# Patient Record
Sex: FEMALE | Race: WHITE | NOT HISPANIC OR LATINO | Employment: OTHER | URBAN - METROPOLITAN AREA
[De-identification: names, ages, dates, MRNs, and addresses within clinical notes are randomized per-mention and may not be internally consistent; named-entity substitution may affect disease eponyms.]

---

## 2017-05-16 PROBLEM — Q79.9 BONY ABNORMALITY: Status: ACTIVE | Noted: 2017-05-16

## 2017-05-16 PROBLEM — J90 PLEURAL EFFUSION: Status: ACTIVE | Noted: 2017-05-16

## 2017-05-16 PROBLEM — Z92.89: Status: ACTIVE | Noted: 2017-05-16

## 2017-06-21 ENCOUNTER — HOSPITAL ENCOUNTER (OUTPATIENT)
Dept: MAMMOGRAPHY | Facility: HOSPITAL | Age: 72
Discharge: HOME | End: 2017-06-21
Attending: INTERNAL MEDICINE | Admitting: INTERNAL MEDICINE

## 2017-06-21 DIAGNOSIS — C50.312 MALIGNANT NEOPLASM OF LOWER-INNER QUADRANT OF LEFT FEMALE BREAST (HCC): ICD-10-CM

## 2017-06-21 DIAGNOSIS — Z12.31 ENCOUNTER FOR SCREENING MAMMOGRAM FOR MALIGNANT NEOPLASM OF BREAST: ICD-10-CM

## 2017-06-21 PROCEDURE — G0202 SCR MAMMO BI INCL CAD: HCPCS

## 2017-06-21 PROCEDURE — 77063 BREAST TOMOSYNTHESIS BI: CPT

## 2017-06-21 PROCEDURE — 77063 BREAST TOMOSYNTHESIS BI: CPT | Performed by: RADIOLOGY

## 2017-06-21 PROCEDURE — G0202 SCR MAMMO BI INCL CAD: HCPCS | Performed by: RADIOLOGY

## 2017-07-12 ENCOUNTER — OFFICE VISIT (OUTPATIENT)
Dept: ONCOLOGY | Facility: CLINIC | Age: 72
End: 2017-07-12

## 2017-07-12 ENCOUNTER — LAB (OUTPATIENT)
Dept: LAB | Facility: HOSPITAL | Age: 72
End: 2017-07-12

## 2017-07-12 VITALS
RESPIRATION RATE: 16 BRPM | HEART RATE: 76 BPM | SYSTOLIC BLOOD PRESSURE: 144 MMHG | WEIGHT: 122 LBS | TEMPERATURE: 97.2 F | BODY MASS INDEX: 23.03 KG/M2 | HEIGHT: 61 IN | DIASTOLIC BLOOD PRESSURE: 78 MMHG

## 2017-07-12 DIAGNOSIS — C50.312 MALIGNANT NEOPLASM OF LOWER-INNER QUADRANT OF LEFT FEMALE BREAST (HCC): ICD-10-CM

## 2017-07-12 DIAGNOSIS — C50.312 MALIGNANT NEOPLASM OF LOWER-INNER QUADRANT OF LEFT FEMALE BREAST (HCC): Primary | ICD-10-CM

## 2017-07-12 DIAGNOSIS — Z12.31 ENCOUNTER FOR SCREENING MAMMOGRAM FOR HIGH-RISK PATIENT: Primary | ICD-10-CM

## 2017-07-12 LAB
ALBUMIN SERPL-MCNC: 4.2 G/DL (ref 3.2–4.8)
ALBUMIN/GLOB SERPL: 1.1 G/DL (ref 1.5–2.5)
ALP SERPL-CCNC: 75 U/L (ref 25–100)
ALT SERPL W P-5'-P-CCNC: 19 U/L (ref 7–40)
ANION GAP SERPL CALCULATED.3IONS-SCNC: 4 MMOL/L (ref 3–11)
AST SERPL-CCNC: 25 U/L (ref 0–33)
BILIRUB SERPL-MCNC: 0.4 MG/DL (ref 0.3–1.2)
BUN BLD-MCNC: 18 MG/DL (ref 9–23)
BUN/CREAT SERPL: 22.5 (ref 7–25)
CALCIUM SPEC-SCNC: 9.8 MG/DL (ref 8.7–10.4)
CHLORIDE SERPL-SCNC: 105 MMOL/L (ref 99–109)
CO2 SERPL-SCNC: 26 MMOL/L (ref 20–31)
CREAT BLD-MCNC: 0.8 MG/DL (ref 0.6–1.3)
ERYTHROCYTE [DISTWIDTH] IN BLOOD BY AUTOMATED COUNT: 13.7 % (ref 11.3–14.5)
GFR SERPL CREATININE-BSD FRML MDRD: 71 ML/MIN/1.73
GLOBULIN UR ELPH-MCNC: 3.9 GM/DL
GLUCOSE BLD-MCNC: 85 MG/DL (ref 70–100)
HCT VFR BLD AUTO: 35.5 % (ref 34.5–44)
HGB BLD-MCNC: 11.3 G/DL (ref 11.5–15.5)
LYMPHOCYTES # BLD AUTO: 3.4 10*3/MM3 (ref 0.6–4.8)
LYMPHOCYTES NFR BLD AUTO: 30.5 % (ref 24–44)
MCH RBC QN AUTO: 27.4 PG (ref 27–31)
MCHC RBC AUTO-ENTMCNC: 31.9 G/DL (ref 32–36)
MCV RBC AUTO: 85.7 FL (ref 80–99)
MONOCYTES # BLD AUTO: 0.8 10*3/MM3 (ref 0–1)
MONOCYTES NFR BLD AUTO: 7.5 % (ref 0–12)
NEUTROPHILS # BLD AUTO: 7 10*3/MM3 (ref 1.5–8.3)
NEUTROPHILS NFR BLD AUTO: 62 % (ref 41–71)
PLATELET # BLD AUTO: 276 10*3/MM3 (ref 150–450)
PMV BLD AUTO: 9.5 FL (ref 6–12)
POTASSIUM BLD-SCNC: 4.2 MMOL/L (ref 3.5–5.5)
PROT SERPL-MCNC: 8.1 G/DL (ref 5.7–8.2)
RBC # BLD AUTO: 4.14 10*6/MM3 (ref 3.89–5.14)
SODIUM BLD-SCNC: 135 MMOL/L (ref 132–146)
WBC NRBC COR # BLD: 11.3 10*3/MM3 (ref 3.5–10.8)

## 2017-07-12 PROCEDURE — 85025 COMPLETE CBC W/AUTO DIFF WBC: CPT

## 2017-07-12 PROCEDURE — 99213 OFFICE O/P EST LOW 20 MIN: CPT | Performed by: INTERNAL MEDICINE

## 2017-07-12 PROCEDURE — 36415 COLL VENOUS BLD VENIPUNCTURE: CPT

## 2017-07-12 PROCEDURE — 80053 COMPREHEN METABOLIC PANEL: CPT | Performed by: INTERNAL MEDICINE

## 2017-07-12 NOTE — PROGRESS NOTES
"Chief Complaint       PROBLEM LIST   Patient Active Problem List   Diagnosis   • Malignant neoplasm of lower-inner quadrant of left female breast   • Pleural effusion   • Bony abnormality   • History of bone scan       HISTORY OF PRESENT ILLNESS:   ***    Past Medical History, Past Surgical History, Social History, Family History have been reviewed and are without significant changes except as mentioned.      Medications:      Current Outpatient Prescriptions:   •  ALPRAZOLAM PO, Take 0.5 mg by mouth as needed., Disp: , Rfl:   •  Calcium Carbonate-Vitamin D (CALCIUM + D PO), Take 1 tablet by mouth daily., Disp: , Rfl:   •  Levothyroxine Sodium (SYNTHROID PO), Take 75 mcg by mouth daily., Disp: , Rfl:   •  OMEPRAZOLE PO, Take 20 mg by mouth daily., Disp: , Rfl:     ALLERGIES:    Allergies   Allergen Reactions   • Sulfa Antibiotics      SULFA (SULFONAMIDE ANTIBIOTICS)       ROS:  Review of Systems  ***      Objective:    /78 Comment: RUE  Pulse 76  Temp 97.2 °F (36.2 °C) (Temporal Artery )   Resp 16  Ht 61\" (154.9 cm)  Wt 122 lb (55.3 kg)  BMI 23.05 kg/m2    Physical Exam ***          RECENT LABS:  Hematology WBC   Date Value Ref Range Status   07/12/2017 11.30 (H) 3.50 - 10.80 10*3/mm3 Final     Hemoglobin   Date Value Ref Range Status   07/12/2017 11.3 (L) 11.5 - 15.5 g/dL Final     Hematocrit   Date Value Ref Range Status   07/12/2017 35.5 34.5 - 44.0 % Final     MCV   Date Value Ref Range Status   07/12/2017 85.7 80.0 - 99.0 fL Final     RDW   Date Value Ref Range Status   07/12/2017 13.7 11.3 - 14.5 % Final     MPV   Date Value Ref Range Status   07/12/2017 9.5 6.0 - 12.0 fL Final     Platelets   Date Value Ref Range Status   07/12/2017 276 150 - 450 10*3/mm3 Final     Neutrophils, Absolute   Date Value Ref Range Status   07/12/2017 7.00 1.50 - 8.30 10*3/mm3 Final     Lymphocytes, Absolute   Date Value Ref Range Status   07/12/2017 3.40 0.60 - 4.80 10*3/mm3 Final     Monocytes, Absolute   Date Value " Ref Range Status   07/12/2017 0.80 0.00 - 1.00 10*3/mm3 Final     Eosinophils Absolute   Date Value Ref Range Status   06/24/2014 0.25 0.10 - 0.30 K/mcL Final     Basophils Absolute   Date Value Ref Range Status   06/24/2014 0.05 0.00 - 0.20 K/mcL Final       Glucose   Date Value Ref Range Status   07/12/2017 85 70 - 100 mg/dL Final     Sodium   Date Value Ref Range Status   07/12/2017 135 132 - 146 mmol/L Final     Potassium   Date Value Ref Range Status   07/12/2017 4.2 3.5 - 5.5 mmol/L Final     CO2   Date Value Ref Range Status   07/12/2017 26.0 20.0 - 31.0 mmol/L Final     Chloride   Date Value Ref Range Status   07/12/2017 105 99 - 109 mmol/L Final     Anion Gap   Date Value Ref Range Status   07/12/2017 4.0 3.0 - 11.0 mmol/L Final     Creatinine   Date Value Ref Range Status   07/12/2017 0.80 0.60 - 1.30 mg/dL Final     BUN   Date Value Ref Range Status   07/12/2017 18 9 - 23 mg/dL Final     BUN/Creatinine Ratio   Date Value Ref Range Status   07/12/2017 22.5 7.0 - 25.0 Final     Calcium   Date Value Ref Range Status   07/12/2017 9.8 8.7 - 10.4 mg/dL Final     eGFR Non  Amer   Date Value Ref Range Status   07/12/2017 71 >60 mL/min/1.73 Final     Alkaline Phosphatase   Date Value Ref Range Status   07/12/2017 75 25 - 100 U/L Final     Total Protein   Date Value Ref Range Status   07/12/2017 8.1 5.7 - 8.2 g/dL Final     ALT (SGPT)   Date Value Ref Range Status   07/12/2017 19 7 - 40 U/L Final     AST (SGOT)   Date Value Ref Range Status   07/12/2017 25 0 - 33 U/L Final     Total Bilirubin   Date Value Ref Range Status   07/12/2017 0.4 0.3 - 1.2 mg/dL Final     Albumin   Date Value Ref Range Status   07/12/2017 4.20 3.20 - 4.80 g/dL Final     Globulin   Date Value Ref Range Status   07/12/2017 3.9 gm/dL Final     A/G Ratio   Date Value Ref Range Status   07/12/2017 1.1 (L) 1.5 - 2.5 g/dL Final          Perf Status: ***    Assessment/Plan    Diagnoses and all orders for this visit:    Malignant neoplasm of  lower-inner quadrant of left female breast      ***      Taqueria Webster MD , 7/12/2017    CC:

## 2017-07-12 NOTE — PROGRESS NOTES
Chief Complaint   Follow-up stage I premenopausal hormone negative left breast cancer diagnosed May 1996, with history of subsequent local failure-status post left mastectomy.    PROBLEM LIST   Patient Active Problem List   Diagnosis   • Malignant neoplasm of lower-inner quadrant of left female breast   • Pleural effusion   • Bony abnormality   • History of bone scan       HISTORY OF PRESENT ILLNESS:   Yearly follow-up.  Feels well.  Health has been good.  No new neurologic bony pulmonary GI or  symptoms.  Has been outdoors a good bit of the summer because she loves to garden.    Past Medical History, Past Surgical History, Social History, Family History have been reviewed and are without significant changes except as mentioned.      Medications:      Current Outpatient Prescriptions:   •  ALPRAZOLAM PO, Take 0.5 mg by mouth as needed., Disp: , Rfl:   •  Calcium Carbonate-Vitamin D (CALCIUM + D PO), Take 1 tablet by mouth daily., Disp: , Rfl:   •  Levothyroxine Sodium (SYNTHROID PO), Take 75 mcg by mouth daily., Disp: , Rfl:   •  OMEPRAZOLE PO, Take 20 mg by mouth daily., Disp: , Rfl:     ALLERGIES:    Allergies   Allergen Reactions   • Sulfa Antibiotics      SULFA (SULFONAMIDE ANTIBIOTICS)       ROS:  Review of Systems   Constitutional: Negative for activity change and appetite change.   HENT: Negative for mouth sores, sinus pressure and voice change.    Eyes: Negative for visual disturbance.   Respiratory: Negative for shortness of breath.    Cardiovascular: Negative for chest pain.   Gastrointestinal: Negative for abdominal pain and vomiting.   Genitourinary: Negative for dysuria.   Musculoskeletal: Negative for arthralgias and myalgias.        Osteoarthritic symptoms at baseline   Skin: Negative for color change.   Neurological: Negative for dizziness, syncope and headaches.   Hematological: Negative for adenopathy.   Psychiatric/Behavioral: Negative for confusion, sleep disturbance and suicidal ideas. The  "patient is not nervous/anxious.            Objective:    /78 Comment: RUE  Pulse 76  Temp 97.2 °F (36.2 °C) (Temporal Artery )   Resp 16  Ht 61\" (154.9 cm)  Wt 122 lb (55.3 kg)  BMI 23.05 kg/m2    Physical Exam   Constitutional: She is oriented to person, place, and time. She appears well-developed and well-nourished. No distress.   Appears youthful and vigorous and healthy   HENT:   Head: Normocephalic.   Mouth/Throat: Oropharynx is clear and moist.   Eyes: Conjunctivae and EOM are normal. No scleral icterus.   Neck: Normal range of motion. Neck supple. No thyromegaly present.   Cardiovascular: Normal rate, regular rhythm and normal heart sounds.    No murmur heard.  Pulmonary/Chest: Effort normal and breath sounds normal. She has no wheezes. She has no rales.   Left breast implant in place.  Cosmesis except above.  No worrisome mass palpable there or in her native right breast.   Abdominal: Soft. Bowel sounds are normal. She exhibits no distension and no mass. There is no tenderness.   Musculoskeletal: She exhibits no edema or tenderness.   Lymphadenopathy:     She has no cervical adenopathy.   Neurological: She is alert and oriented to person, place, and time. She displays normal reflexes. No cranial nerve deficit.   Skin: Skin is warm and dry. No rash noted.   Psychiatric: She has a normal mood and affect. Judgment normal.   Vitals reviewed.             RECENT LABS:  Hematology WBC   Date Value Ref Range Status   07/12/2017 11.30 (H) 3.50 - 10.80 10*3/mm3 Final     Hemoglobin   Date Value Ref Range Status   07/12/2017 11.3 (L) 11.5 - 15.5 g/dL Final     Hematocrit   Date Value Ref Range Status   07/12/2017 35.5 34.5 - 44.0 % Final     MCV   Date Value Ref Range Status   07/12/2017 85.7 80.0 - 99.0 fL Final     RDW   Date Value Ref Range Status   07/12/2017 13.7 11.3 - 14.5 % Final     MPV   Date Value Ref Range Status   07/12/2017 9.5 6.0 - 12.0 fL Final     Platelets   Date Value Ref Range Status "   07/12/2017 276 150 - 450 10*3/mm3 Final     Neutrophils, Absolute   Date Value Ref Range Status   07/12/2017 7.00 1.50 - 8.30 10*3/mm3 Final     Lymphocytes, Absolute   Date Value Ref Range Status   07/12/2017 3.40 0.60 - 4.80 10*3/mm3 Final     Monocytes, Absolute   Date Value Ref Range Status   07/12/2017 0.80 0.00 - 1.00 10*3/mm3 Final     Eosinophils Absolute   Date Value Ref Range Status   06/24/2014 0.25 0.10 - 0.30 K/mcL Final     Basophils Absolute   Date Value Ref Range Status   06/24/2014 0.05 0.00 - 0.20 K/mcL Final       Glucose   Date Value Ref Range Status   07/12/2017 85 70 - 100 mg/dL Final     Sodium   Date Value Ref Range Status   07/12/2017 135 132 - 146 mmol/L Final     Potassium   Date Value Ref Range Status   07/12/2017 4.2 3.5 - 5.5 mmol/L Final     CO2   Date Value Ref Range Status   07/12/2017 26.0 20.0 - 31.0 mmol/L Final     Chloride   Date Value Ref Range Status   07/12/2017 105 99 - 109 mmol/L Final     Anion Gap   Date Value Ref Range Status   07/12/2017 4.0 3.0 - 11.0 mmol/L Final     Creatinine   Date Value Ref Range Status   07/12/2017 0.80 0.60 - 1.30 mg/dL Final     BUN   Date Value Ref Range Status   07/12/2017 18 9 - 23 mg/dL Final     BUN/Creatinine Ratio   Date Value Ref Range Status   07/12/2017 22.5 7.0 - 25.0 Final     Calcium   Date Value Ref Range Status   07/12/2017 9.8 8.7 - 10.4 mg/dL Final     eGFR Non  Amer   Date Value Ref Range Status   07/12/2017 71 >60 mL/min/1.73 Final     Alkaline Phosphatase   Date Value Ref Range Status   07/12/2017 75 25 - 100 U/L Final     Total Protein   Date Value Ref Range Status   07/12/2017 8.1 5.7 - 8.2 g/dL Final     ALT (SGPT)   Date Value Ref Range Status   07/12/2017 19 7 - 40 U/L Final     AST (SGOT)   Date Value Ref Range Status   07/12/2017 25 0 - 33 U/L Final     Total Bilirubin   Date Value Ref Range Status   07/12/2017 0.4 0.3 - 1.2 mg/dL Final     Albumin   Date Value Ref Range Status   07/12/2017 4.20 3.20 - 4.80  g/dL Final     Globulin   Date Value Ref Range Status   07/12/2017 3.9 gm/dL Final     A/G Ratio   Date Value Ref Range Status   07/12/2017 1.1 (L) 1.5 - 2.5 g/dL Final          Perf Status:0    Assessment/Plan    Diagnoses and all orders for this visit:    Malignant neoplasm of lower-inner quadrant of left female breast      Patient is doing well.  Right mammography in June was benign.  She has no evidence of recurrence.  I'll plan on seeing her back in a year as usual.      Taqueria Webster MD , 7/12/2017    CC:

## 2017-07-17 ENCOUNTER — RESULTS ENCOUNTER (OUTPATIENT)
Dept: ONCOLOGY | Facility: CLINIC | Age: 72
End: 2017-07-17

## 2017-07-17 DIAGNOSIS — C50.312 MALIGNANT NEOPLASM OF LOWER-INNER QUADRANT OF LEFT FEMALE BREAST (HCC): ICD-10-CM

## 2017-10-02 ENCOUNTER — TELEPHONE (OUTPATIENT)
Dept: ONCOLOGY | Facility: CLINIC | Age: 72
End: 2017-10-02

## 2017-10-02 NOTE — TELEPHONE ENCOUNTER
Patient advised that her proteins didn't look terribly abnormal, but I will make sure that dr Webster has looked them over when he is back in the office on Wednesday.  Will call her back after I discuss it with Dr Webster

## 2017-10-02 NOTE — TELEPHONE ENCOUNTER
----- Message from Madiha Flynn sent at 10/2/2017 12:26 PM EDT -----  Regarding: [facility]  Contact: [phone]  Patient called and wants to make sure Dr. Webster reviewed her labs from Dr. Kaylin Khalil and if he needs to see her?

## 2017-10-04 ENCOUNTER — TELEPHONE (OUTPATIENT)
Dept: ONCOLOGY | Facility: CLINIC | Age: 72
End: 2017-10-04

## 2018-06-22 ENCOUNTER — APPOINTMENT (OUTPATIENT)
Dept: MAMMOGRAPHY | Facility: HOSPITAL | Age: 73
End: 2018-06-22
Attending: INTERNAL MEDICINE

## 2018-06-27 ENCOUNTER — HOSPITAL ENCOUNTER (OUTPATIENT)
Dept: MAMMOGRAPHY | Facility: HOSPITAL | Age: 73
Discharge: HOME | End: 2018-06-27
Attending: INTERNAL MEDICINE | Admitting: INTERNAL MEDICINE

## 2018-06-27 DIAGNOSIS — Z12.31 ENCOUNTER FOR SCREENING MAMMOGRAM FOR HIGH-RISK PATIENT: ICD-10-CM

## 2018-06-27 DIAGNOSIS — C50.312 MALIGNANT NEOPLASM OF LOWER-INNER QUADRANT OF LEFT FEMALE BREAST (HCC): ICD-10-CM

## 2018-06-27 PROCEDURE — 77067 SCR MAMMO BI INCL CAD: CPT

## 2018-06-27 PROCEDURE — 77067 SCR MAMMO BI INCL CAD: CPT | Performed by: RADIOLOGY

## 2018-06-27 PROCEDURE — 77063 BREAST TOMOSYNTHESIS BI: CPT

## 2018-06-27 PROCEDURE — 77063 BREAST TOMOSYNTHESIS BI: CPT | Performed by: RADIOLOGY

## 2018-07-10 DIAGNOSIS — C50.312 MALIGNANT NEOPLASM OF LOWER-INNER QUADRANT OF LEFT FEMALE BREAST, UNSPECIFIED ESTROGEN RECEPTOR STATUS (HCC): Primary | ICD-10-CM

## 2018-07-11 ENCOUNTER — LAB (OUTPATIENT)
Dept: LAB | Facility: HOSPITAL | Age: 73
End: 2018-07-11

## 2018-07-11 ENCOUNTER — OFFICE VISIT (OUTPATIENT)
Dept: ONCOLOGY | Facility: CLINIC | Age: 73
End: 2018-07-11

## 2018-07-11 VITALS
RESPIRATION RATE: 16 BRPM | TEMPERATURE: 97.1 F | WEIGHT: 125 LBS | BODY MASS INDEX: 23.6 KG/M2 | HEIGHT: 61 IN | HEART RATE: 71 BPM | SYSTOLIC BLOOD PRESSURE: 141 MMHG | DIASTOLIC BLOOD PRESSURE: 82 MMHG

## 2018-07-11 DIAGNOSIS — C50.312 MALIGNANT NEOPLASM OF LOWER-INNER QUADRANT OF LEFT FEMALE BREAST, UNSPECIFIED ESTROGEN RECEPTOR STATUS (HCC): ICD-10-CM

## 2018-07-11 DIAGNOSIS — Z12.31 ENCOUNTER FOR SCREENING MAMMOGRAM FOR HIGH-RISK PATIENT: Primary | ICD-10-CM

## 2018-07-11 DIAGNOSIS — C50.312 MALIGNANT NEOPLASM OF LOWER-INNER QUADRANT OF LEFT FEMALE BREAST, UNSPECIFIED ESTROGEN RECEPTOR STATUS (HCC): Primary | ICD-10-CM

## 2018-07-11 LAB
ALBUMIN SERPL-MCNC: 4.07 G/DL (ref 3.2–4.8)
ALBUMIN/GLOB SERPL: 1.2 G/DL (ref 1.5–2.5)
ALP SERPL-CCNC: 65 U/L (ref 25–100)
ALT SERPL W P-5'-P-CCNC: 14 U/L (ref 7–40)
ANION GAP SERPL CALCULATED.3IONS-SCNC: 6 MMOL/L (ref 3–11)
AST SERPL-CCNC: 23 U/L (ref 0–33)
BILIRUB SERPL-MCNC: 0.3 MG/DL (ref 0.3–1.2)
BUN BLD-MCNC: 19 MG/DL (ref 9–23)
BUN/CREAT SERPL: 21.8 (ref 7–25)
CALCIUM SPEC-SCNC: 9.2 MG/DL (ref 8.7–10.4)
CHLORIDE SERPL-SCNC: 109 MMOL/L (ref 99–109)
CO2 SERPL-SCNC: 25 MMOL/L (ref 20–31)
CREAT BLD-MCNC: 0.87 MG/DL (ref 0.6–1.3)
ERYTHROCYTE [DISTWIDTH] IN BLOOD BY AUTOMATED COUNT: 13.9 % (ref 11.3–14.5)
GFR SERPL CREATININE-BSD FRML MDRD: 64 ML/MIN/1.73
GLOBULIN UR ELPH-MCNC: 3.5 GM/DL
GLUCOSE BLD-MCNC: 73 MG/DL (ref 70–100)
HCT VFR BLD AUTO: 36.9 % (ref 34.5–44)
HGB BLD-MCNC: 11.7 G/DL (ref 11.5–15.5)
LYMPHOCYTES # BLD AUTO: 2.5 10*3/MM3 (ref 0.6–4.8)
LYMPHOCYTES NFR BLD AUTO: 36.4 % (ref 24–44)
MCH RBC QN AUTO: 27.5 PG (ref 27–31)
MCHC RBC AUTO-ENTMCNC: 31.7 G/DL (ref 32–36)
MCV RBC AUTO: 86.7 FL (ref 80–99)
MONOCYTES # BLD AUTO: 0.8 10*3/MM3 (ref 0–1)
MONOCYTES NFR BLD AUTO: 11.5 % (ref 0–12)
NEUTROPHILS # BLD AUTO: 3.6 10*3/MM3 (ref 1.5–8.3)
NEUTROPHILS NFR BLD AUTO: 52.1 % (ref 41–71)
PLATELET # BLD AUTO: 198 10*3/MM3 (ref 150–450)
PMV BLD AUTO: 10.2 FL (ref 6–12)
POTASSIUM BLD-SCNC: 3.8 MMOL/L (ref 3.5–5.5)
PROT SERPL-MCNC: 7.6 G/DL (ref 5.7–8.2)
RBC # BLD AUTO: 4.26 10*6/MM3 (ref 3.89–5.14)
SODIUM BLD-SCNC: 140 MMOL/L (ref 132–146)
WBC NRBC COR # BLD: 6.9 10*3/MM3 (ref 3.5–10.8)

## 2018-07-11 PROCEDURE — 80053 COMPREHEN METABOLIC PANEL: CPT

## 2018-07-11 PROCEDURE — 85025 COMPLETE CBC W/AUTO DIFF WBC: CPT

## 2018-07-11 PROCEDURE — 36415 COLL VENOUS BLD VENIPUNCTURE: CPT

## 2018-07-11 PROCEDURE — 99213 OFFICE O/P EST LOW 20 MIN: CPT | Performed by: INTERNAL MEDICINE

## 2018-07-11 NOTE — PROGRESS NOTES
Chief Complaint   Follow-up stage I premenopausal hormone negative left breast cancer diagnosed in May 1996, with history of subsequent local failure years ago necessitating left mastectomy after original lumpectomy and radiotherapy-followed without recurrence sinceapproximately approximately 2011.      PROBLEM LIST   Patient Active Problem List   Diagnosis   • Malignant neoplasm of lower-inner quadrant of left female breast (CMS/HCC)   • Pleural effusion   • Bony abnormality   • History of bone scan       HISTORY OF PRESENT ILLNESS:   Feels well.  She and her  are both retired and they spend a good bit of time caring for their grandchildren and great-grandchildren.  Her health is been good.  She has not noted any new lumps bumps or any enlarged lymph nodes.  She has no new neurologic or bony symptoms.    Past Medical History, Past Surgical History, Social History, Family History have been reviewed and are without significant changes except as mentioned.      Medications:      Current Outpatient Prescriptions:   •  Denosumab (PROLIA SC), Inject  under the skin Every 6 (Six) Months., Disp: , Rfl:   •  ALPRAZOLAM PO, Take 0.5 mg by mouth as needed., Disp: , Rfl:   •  Calcium Carbonate-Vitamin D (CALCIUM + D PO), Take 1 tablet by mouth daily., Disp: , Rfl:   •  Levothyroxine Sodium (SYNTHROID PO), Take 75 mcg by mouth daily., Disp: , Rfl:   •  OMEPRAZOLE PO, Take 20 mg by mouth daily., Disp: , Rfl:     ALLERGIES:    Allergies   Allergen Reactions   • Sulfa Antibiotics      SULFA (SULFONAMIDE ANTIBIOTICS)       ROS:  Review of Systems   Constitutional: Negative for activity change and appetite change.        Overall vigor is good   HENT: Negative for mouth sores, sinus pressure and voice change.    Eyes: Negative for visual disturbance.   Respiratory: Negative for shortness of breath.    Cardiovascular: Negative for chest pain.   Gastrointestinal: Negative for abdominal pain and vomiting.   Genitourinary:  "Negative for dysuria.   Musculoskeletal: Negative for arthralgias and myalgias.        Chronic osteoarthritic symptoms at baseline   Skin: Negative for color change.   Neurological: Negative for dizziness, syncope and headaches.   Hematological: Negative for adenopathy.   Psychiatric/Behavioral: Negative for confusion, sleep disturbance and suicidal ideas. The patient is not nervous/anxious.            Objective:    /82 Comment: RUE  Pulse 71   Temp 97.1 °F (36.2 °C) (Temporal Artery )   Resp 16   Ht 154.9 cm (61\")   Wt 56.7 kg (125 lb)   BMI 23.62 kg/m²     Physical Exam   Constitutional: She is oriented to person, place, and time. She appears well-developed and well-nourished. No distress.   Patient appears vigorous and healthy and younger than stated age   HENT:   Head: Normocephalic.   Mouth/Throat: Oropharynx is clear and moist.   Eyes: Conjunctivae and EOM are normal. No scleral icterus.   Neck: Normal range of motion. Neck supple. No thyromegaly present.   Cardiovascular: Normal rate, regular rhythm and normal heart sounds.    No murmur heard.  Pulmonary/Chest: Effort normal and breath sounds normal. She has no wheezes. She has no rales.   Left breast prosthesis in place with no worrisome mass palpable.  Right breast remarkable for evidence of remote biopsies-no worrisome mass palpable there either   Abdominal: Soft. Bowel sounds are normal. She exhibits no distension and no mass. There is no tenderness.   Musculoskeletal: She exhibits no edema or tenderness.   Lymphadenopathy:     She has no cervical adenopathy.   Neurological: She is alert and oriented to person, place, and time. She displays normal reflexes. No cranial nerve deficit.   Skin: Skin is warm and dry. No rash noted.   Psychiatric: She has a normal mood and affect. Judgment normal.   Vitals reviewed.             RECENT LABS:  Hematology WBC   Date Value Ref Range Status   07/11/2018 6.90 3.50 - 10.80 10*3/mm3 Final     Hemoglobin "   Date Value Ref Range Status   07/11/2018 11.7 11.5 - 15.5 g/dL Final     Hematocrit   Date Value Ref Range Status   07/11/2018 36.9 34.5 - 44.0 % Final     MCV   Date Value Ref Range Status   07/11/2018 86.7 80.0 - 99.0 fL Final     RDW   Date Value Ref Range Status   07/11/2018 13.9 11.3 - 14.5 % Final     MPV   Date Value Ref Range Status   07/11/2018 10.2 6.0 - 12.0 fL Final     Platelets   Date Value Ref Range Status   07/11/2018 198 150 - 450 10*3/mm3 Final     Neutrophils, Absolute   Date Value Ref Range Status   07/11/2018 3.60 1.50 - 8.30 10*3/mm3 Final     Lymphocytes, Absolute   Date Value Ref Range Status   07/11/2018 2.50 0.60 - 4.80 10*3/mm3 Final     Monocytes, Absolute   Date Value Ref Range Status   07/11/2018 0.80 0.00 - 1.00 10*3/mm3 Final     Eosinophils Absolute   Date Value Ref Range Status   06/24/2014 0.25 0.10 - 0.30 K/mcL Final     Basophils Absolute   Date Value Ref Range Status   06/24/2014 0.05 0.00 - 0.20 K/mcL Final       Glucose   Date Value Ref Range Status   07/11/2018 73 70 - 100 mg/dL Final     Sodium   Date Value Ref Range Status   07/11/2018 140 132 - 146 mmol/L Final     Potassium   Date Value Ref Range Status   07/11/2018 3.8 3.5 - 5.5 mmol/L Final     CO2   Date Value Ref Range Status   07/11/2018 25.0 20.0 - 31.0 mmol/L Final     Chloride   Date Value Ref Range Status   07/11/2018 109 99 - 109 mmol/L Final     Anion Gap   Date Value Ref Range Status   07/11/2018 6.0 3.0 - 11.0 mmol/L Final     Creatinine   Date Value Ref Range Status   07/11/2018 0.87 0.60 - 1.30 mg/dL Final     BUN   Date Value Ref Range Status   07/11/2018 19 9 - 23 mg/dL Final     BUN/Creatinine Ratio   Date Value Ref Range Status   07/11/2018 21.8 7.0 - 25.0 Final     Calcium   Date Value Ref Range Status   07/11/2018 9.2 8.7 - 10.4 mg/dL Final     eGFR Non  Amer   Date Value Ref Range Status   07/11/2018 64 >60 mL/min/1.73 Final     Alkaline Phosphatase   Date Value Ref Range Status   07/11/2018  65 25 - 100 U/L Final     Total Protein   Date Value Ref Range Status   07/11/2018 7.6 5.7 - 8.2 g/dL Final     ALT (SGPT)   Date Value Ref Range Status   07/11/2018 14 7 - 40 U/L Final     AST (SGOT)   Date Value Ref Range Status   07/11/2018 23 0 - 33 U/L Final     Total Bilirubin   Date Value Ref Range Status   07/11/2018 0.3 0.3 - 1.2 mg/dL Final     Albumin   Date Value Ref Range Status   07/11/2018 4.07 3.20 - 4.80 g/dL Final     Globulin   Date Value Ref Range Status   07/11/2018 3.5 gm/dL Final     A/G Ratio   Date Value Ref Range Status   07/11/2018 1.2 (L) 1.5 - 2.5 g/dL Final          Perf Status:0    Assessment/Plan    Diagnoses and all orders for this visit:    Malignant neoplasm of lower-inner quadrant of left female breast, unspecified estrogen receptor status (CMS/HCC)      Patient is doing splendidly.  She has no evidence of recurrent disease.  She is more than 6 years out and I think action more than 7 years out from her local failure.  She.follow up with her primary care physician which I think is quite fine.  She is up-to-date regarding mammography and I told her to continue getting those every June or July.      Taqueria Webster MD , 7/11/2018    CC:

## 2019-06-28 ENCOUNTER — HOSPITAL ENCOUNTER (OUTPATIENT)
Dept: MAMMOGRAPHY | Facility: HOSPITAL | Age: 74
Discharge: HOME | End: 2019-06-28
Attending: INTERNAL MEDICINE | Admitting: INTERNAL MEDICINE

## 2019-06-28 DIAGNOSIS — Z12.31 ENCOUNTER FOR SCREENING MAMMOGRAM FOR HIGH-RISK PATIENT: ICD-10-CM

## 2019-06-28 DIAGNOSIS — C50.312 MALIGNANT NEOPLASM OF LOWER-INNER QUADRANT OF LEFT FEMALE BREAST, UNSPECIFIED ESTROGEN RECEPTOR STATUS (HCC): ICD-10-CM

## 2019-06-28 PROCEDURE — 77067 SCR MAMMO BI INCL CAD: CPT

## 2019-06-28 PROCEDURE — 77067 SCR MAMMO BI INCL CAD: CPT | Performed by: RADIOLOGY

## 2019-06-28 PROCEDURE — 77063 BREAST TOMOSYNTHESIS BI: CPT | Performed by: RADIOLOGY

## 2019-06-28 PROCEDURE — 77063 BREAST TOMOSYNTHESIS BI: CPT

## 2019-07-16 DIAGNOSIS — C50.312 MALIGNANT NEOPLASM OF LOWER-INNER QUADRANT OF LEFT FEMALE BREAST, UNSPECIFIED ESTROGEN RECEPTOR STATUS (HCC): Primary | ICD-10-CM

## 2019-07-18 NOTE — PROGRESS NOTES
"      PROBLEM LIST:  1. History of stage I ER negative left breast cancer, diagnosed 1996.    A) lumpectomy showed a 3 cm primary tumor, 21 LN uninvolved.  B) local recurrence treated with mastectomy 2001    Subjective     CHIEF COMPLAINT: breast cancer    HISTORY OF PRESENT ILLNESS:   Brenda Montes De Oca returns for follow-up.    She is transitioning care from Dr. Webster.  She says she is feeling well and has no complaints today.  She has been seen yearly with mammograms for the past several years.    Past Medical History, Past Surgical History, Social History, Family History have been reviewed and are without significant changes except as mentioned.    Review of Systems   A comprehensive 14 point review of systems was performed and was negative except as mentioned.    Medications:  The current medication list was reviewed in the EMR    ALLERGIES:    Allergies   Allergen Reactions   • Sulfa Antibiotics      SULFA (SULFONAMIDE ANTIBIOTICS)       Objective      /75 Comment: RUE  Pulse 72   Temp 97.1 °F (36.2 °C) (Temporal)   Resp 16   Ht 154.9 cm (61\")   Wt 54.4 kg (120 lb)   SpO2 99% Comment: RA  BMI 22.67 kg/m²      Performance Status: 0    General: well appearing female in no acute distress  Neuro: alert and oriented  HEENT: sclera anicteric, oropharynx clear  Lymphatics: no cervical, supraclavicular, or axillary adenopathy  Cardiovascular: regular rate and rhythm, no murmurs  Lungs: clear to auscultation bilaterally  Abdomen: soft, nontender, nondistended.  No palpable organomegaly  Extremeties: no lower extremity edema  Skin: no rashes, lesions, bruising, or petechiae  Psych: mood and affect appropriate      RECENT LABS:  Results for BRENDA MONTES DE OCA (MRN [id number]) as of 7/19/2019 11:23   Ref. Range 7/19/2019 10:00   WBC Latest Ref Range: 3.40 - 10.80 10*3/mm3 6.20   RBC Latest Ref Range: 3.77 - 5.28 10*6/mm3 4.20   Hemoglobin Latest Ref Range: 12.0 - 15.9 g/dL 12.1   Hematocrit Latest Ref Range: " 34.0 - 46.6 % 36.6   RDW Latest Ref Range: 12.3 - 15.4 % 13.4   MCV Latest Ref Range: 79.0 - 97.0 fL 87.1   MCH Latest Ref Range: 26.6 - 33.0 pg 28.8   MCHC Latest Ref Range: 31.5 - 35.7 g/dL 33.1   MPV Latest Ref Range: 6.0 - 12.0 fL 9.5   Platelets Latest Ref Range: 140 - 450 10*3/mm3 272         Right mammogram 6/28/19:     IMPRESSION:  No findings suspicious for malignancy.      BI-RADS CATEGORY: 2, BENIGN     RECOMMENDATION: Yearly mammogram, yearly clinical breast exam, and  encourage self breast awareness.      Assessment/Plan   Brenda Murphy is a 73 y.o. year old female with a history of breast cancer, ER negative, in 1996, and recurrent left breast cancer in 2001 treated with mastectomy.    At this point she does not have any significant recurrence risk.  She should continue her mammograms of the right breast on a yearly basis.  I think she can follow-up with her primary care doctor going forward.  I am certainly happy to see her if the need arises in the future.    She asked about her need to continue routine Pap smears.  Her recommendations would not be any different from the general population.    Follow-up PRN                I spent 15 minutes with the patient. I spent > 50% percent of this time counseling and discussing prognosis, diagnostic testing, evaluation, current status and management.        Ashely Butts MD  [facility] Hematology and Oncology    7/19/2019          CC:

## 2019-07-19 ENCOUNTER — OFFICE VISIT (OUTPATIENT)
Dept: ONCOLOGY | Facility: CLINIC | Age: 74
End: 2019-07-19

## 2019-07-19 ENCOUNTER — LAB (OUTPATIENT)
Dept: LAB | Facility: HOSPITAL | Age: 74
End: 2019-07-19

## 2019-07-19 VITALS
HEIGHT: 61 IN | OXYGEN SATURATION: 99 % | DIASTOLIC BLOOD PRESSURE: 75 MMHG | HEART RATE: 72 BPM | BODY MASS INDEX: 22.66 KG/M2 | SYSTOLIC BLOOD PRESSURE: 155 MMHG | RESPIRATION RATE: 16 BRPM | TEMPERATURE: 97.1 F | WEIGHT: 120 LBS

## 2019-07-19 DIAGNOSIS — C50.312 MALIGNANT NEOPLASM OF LOWER-INNER QUADRANT OF LEFT FEMALE BREAST, UNSPECIFIED ESTROGEN RECEPTOR STATUS (HCC): Primary | ICD-10-CM

## 2019-07-19 DIAGNOSIS — C50.312 MALIGNANT NEOPLASM OF LOWER-INNER QUADRANT OF LEFT FEMALE BREAST, UNSPECIFIED ESTROGEN RECEPTOR STATUS (HCC): ICD-10-CM

## 2019-07-19 LAB
ALBUMIN SERPL-MCNC: 3.9 G/DL (ref 3.5–5.2)
ALBUMIN/GLOB SERPL: 0.9 G/DL
ALP SERPL-CCNC: 64 U/L (ref 39–117)
ALT SERPL W P-5'-P-CCNC: 18 U/L (ref 1–33)
ANION GAP SERPL CALCULATED.3IONS-SCNC: 12 MMOL/L (ref 5–15)
AST SERPL-CCNC: 29 U/L (ref 1–32)
BILIRUB SERPL-MCNC: 0.3 MG/DL (ref 0.2–1.2)
BUN BLD-MCNC: 11 MG/DL (ref 8–23)
BUN/CREAT SERPL: 13.1 (ref 7–25)
CALCIUM SPEC-SCNC: 9.4 MG/DL (ref 8.6–10.5)
CHLORIDE SERPL-SCNC: 97 MMOL/L (ref 98–107)
CO2 SERPL-SCNC: 25 MMOL/L (ref 22–29)
CREAT BLD-MCNC: 0.84 MG/DL (ref 0.57–1)
ERYTHROCYTE [DISTWIDTH] IN BLOOD BY AUTOMATED COUNT: 13.4 % (ref 12.3–15.4)
GFR SERPL CREATININE-BSD FRML MDRD: 67 ML/MIN/1.73
GLOBULIN UR ELPH-MCNC: 4.2 GM/DL
GLUCOSE BLD-MCNC: 95 MG/DL (ref 65–99)
HCT VFR BLD AUTO: 36.6 % (ref 34–46.6)
HGB BLD-MCNC: 12.1 G/DL (ref 12–15.9)
LYMPHOCYTES # BLD AUTO: 2.9 10*3/MM3 (ref 0.7–3.1)
LYMPHOCYTES NFR BLD AUTO: 47.1 % (ref 19.6–45.3)
MCH RBC QN AUTO: 28.8 PG (ref 26.6–33)
MCHC RBC AUTO-ENTMCNC: 33.1 G/DL (ref 31.5–35.7)
MCV RBC AUTO: 87.1 FL (ref 79–97)
MONOCYTES # BLD AUTO: 0.7 10*3/MM3 (ref 0.1–0.9)
MONOCYTES NFR BLD AUTO: 10.7 % (ref 5–12)
NEUTROPHILS # BLD AUTO: 2.6 10*3/MM3 (ref 1.7–7)
NEUTROPHILS NFR BLD AUTO: 42.2 % (ref 42.7–76)
PLATELET # BLD AUTO: 272 10*3/MM3 (ref 140–450)
PMV BLD AUTO: 9.5 FL (ref 6–12)
POTASSIUM BLD-SCNC: 4 MMOL/L (ref 3.5–5.2)
PROT SERPL-MCNC: 8.1 G/DL (ref 6–8.5)
RBC # BLD AUTO: 4.2 10*6/MM3 (ref 3.77–5.28)
SODIUM BLD-SCNC: 134 MMOL/L (ref 136–145)
WBC NRBC COR # BLD: 6.2 10*3/MM3 (ref 3.4–10.8)

## 2019-07-19 PROCEDURE — 80053 COMPREHEN METABOLIC PANEL: CPT

## 2019-07-19 PROCEDURE — 36415 COLL VENOUS BLD VENIPUNCTURE: CPT

## 2019-07-19 PROCEDURE — 99213 OFFICE O/P EST LOW 20 MIN: CPT | Performed by: INTERNAL MEDICINE

## 2019-07-19 PROCEDURE — 85025 COMPLETE CBC W/AUTO DIFF WBC: CPT

## 2020-03-05 ENCOUNTER — TRANSCRIBE ORDERS (OUTPATIENT)
Dept: ADMINISTRATIVE | Facility: HOSPITAL | Age: 75
End: 2020-03-05

## 2020-03-05 DIAGNOSIS — Z12.31 VISIT FOR SCREENING MAMMOGRAM: Primary | ICD-10-CM

## 2020-07-02 ENCOUNTER — HOSPITAL ENCOUNTER (OUTPATIENT)
Dept: MAMMOGRAPHY | Facility: HOSPITAL | Age: 75
Discharge: HOME | End: 2020-07-02
Admitting: FAMILY MEDICINE

## 2020-07-02 DIAGNOSIS — Z12.31 VISIT FOR SCREENING MAMMOGRAM: ICD-10-CM

## 2020-07-02 PROCEDURE — 77067 SCR MAMMO BI INCL CAD: CPT

## 2020-07-02 PROCEDURE — 77063 BREAST TOMOSYNTHESIS BI: CPT

## 2020-07-02 PROCEDURE — 77067 SCR MAMMO BI INCL CAD: CPT | Performed by: RADIOLOGY

## 2020-07-02 PROCEDURE — 77063 BREAST TOMOSYNTHESIS BI: CPT | Performed by: RADIOLOGY

## 2021-03-03 ENCOUNTER — TRANSCRIBE ORDERS (OUTPATIENT)
Dept: LAB | Facility: HOSPITAL | Age: 76
End: 2021-03-03

## 2021-03-03 ENCOUNTER — LAB (OUTPATIENT)
Dept: LAB | Facility: HOSPITAL | Age: 76
End: 2021-03-03

## 2021-03-03 DIAGNOSIS — L53.9 ERYTHEMATOUS CONDITION: ICD-10-CM

## 2021-03-03 DIAGNOSIS — R03.0 ELEVATED BLOOD PRESSURE READING WITHOUT DIAGNOSIS OF HYPERTENSION: Primary | ICD-10-CM

## 2021-03-03 DIAGNOSIS — R22.1 NECK MASS: ICD-10-CM

## 2021-03-03 DIAGNOSIS — R03.0 ELEVATED BLOOD PRESSURE READING WITHOUT DIAGNOSIS OF HYPERTENSION: ICD-10-CM

## 2021-03-03 LAB
ALBUMIN SERPL-MCNC: 4.2 G/DL (ref 3.5–5.2)
ALBUMIN/GLOB SERPL: 1.1 G/DL
ALP SERPL-CCNC: 81 U/L (ref 39–117)
ALT SERPL W P-5'-P-CCNC: 13 U/L (ref 1–33)
ANION GAP SERPL CALCULATED.3IONS-SCNC: 10.6 MMOL/L (ref 5–15)
AST SERPL-CCNC: 24 U/L (ref 1–32)
BASOPHILS # BLD AUTO: 0.06 10*3/MM3 (ref 0–0.2)
BASOPHILS NFR BLD AUTO: 0.7 % (ref 0–1.5)
BILIRUB SERPL-MCNC: 0.4 MG/DL (ref 0–1.2)
BUN SERPL-MCNC: 13 MG/DL (ref 8–23)
BUN/CREAT SERPL: 13.3 (ref 7–25)
CALCIUM SPEC-SCNC: 9.6 MG/DL (ref 8.6–10.5)
CHLORIDE SERPL-SCNC: 98 MMOL/L (ref 98–107)
CO2 SERPL-SCNC: 23.4 MMOL/L (ref 22–29)
CREAT SERPL-MCNC: 0.98 MG/DL (ref 0.57–1)
DEPRECATED RDW RBC AUTO: 41.9 FL (ref 37–54)
EOSINOPHIL # BLD AUTO: 0.25 10*3/MM3 (ref 0–0.4)
EOSINOPHIL NFR BLD AUTO: 2.9 % (ref 0.3–6.2)
ERYTHROCYTE [DISTWIDTH] IN BLOOD BY AUTOMATED COUNT: 13.4 % (ref 12.3–15.4)
GFR SERPL CREATININE-BSD FRML MDRD: 55 ML/MIN/1.73
GLOBULIN UR ELPH-MCNC: 3.8 GM/DL
GLUCOSE SERPL-MCNC: 91 MG/DL (ref 65–99)
HCT VFR BLD AUTO: 36.6 % (ref 34–46.6)
HGB BLD-MCNC: 12.2 G/DL (ref 12–15.9)
IMM GRANULOCYTES # BLD AUTO: 0.03 10*3/MM3 (ref 0–0.05)
IMM GRANULOCYTES NFR BLD AUTO: 0.4 % (ref 0–0.5)
LYMPHOCYTES # BLD AUTO: 3.48 10*3/MM3 (ref 0.7–3.1)
LYMPHOCYTES NFR BLD AUTO: 40.6 % (ref 19.6–45.3)
MCH RBC QN AUTO: 28.7 PG (ref 26.6–33)
MCHC RBC AUTO-ENTMCNC: 33.3 G/DL (ref 31.5–35.7)
MCV RBC AUTO: 86.1 FL (ref 79–97)
MONOCYTES # BLD AUTO: 1.09 10*3/MM3 (ref 0.1–0.9)
MONOCYTES NFR BLD AUTO: 12.7 % (ref 5–12)
NEUTROPHILS NFR BLD AUTO: 3.66 10*3/MM3 (ref 1.7–7)
NEUTROPHILS NFR BLD AUTO: 42.7 % (ref 42.7–76)
NRBC BLD AUTO-RTO: 0 /100 WBC (ref 0–0.2)
PLATELET # BLD AUTO: 314 10*3/MM3 (ref 140–450)
PMV BLD AUTO: 11.2 FL (ref 6–12)
POTASSIUM SERPL-SCNC: 4.2 MMOL/L (ref 3.5–5.2)
PROT SERPL-MCNC: 8 G/DL (ref 6–8.5)
RBC # BLD AUTO: 4.25 10*6/MM3 (ref 3.77–5.28)
SODIUM SERPL-SCNC: 132 MMOL/L (ref 136–145)
TSH SERPL DL<=0.05 MIU/L-ACNC: 2.68 UIU/ML (ref 0.27–4.2)
WBC # BLD AUTO: 8.57 10*3/MM3 (ref 3.4–10.8)

## 2021-03-03 PROCEDURE — 36415 COLL VENOUS BLD VENIPUNCTURE: CPT

## 2021-03-03 PROCEDURE — 80053 COMPREHEN METABOLIC PANEL: CPT

## 2021-03-03 PROCEDURE — 85025 COMPLETE CBC W/AUTO DIFF WBC: CPT

## 2021-03-03 PROCEDURE — 84443 ASSAY THYROID STIM HORMONE: CPT

## 2021-03-05 ENCOUNTER — HOSPITAL ENCOUNTER (OUTPATIENT)
Dept: CARDIOLOGY | Facility: HOSPITAL | Age: 76
Discharge: HOME | End: 2021-03-05
Admitting: OTOLARYNGOLOGY

## 2021-03-05 ENCOUNTER — TRANSCRIBE ORDERS (OUTPATIENT)
Dept: CARDIOLOGY | Facility: HOSPITAL | Age: 76
End: 2021-03-05

## 2021-03-05 DIAGNOSIS — Z01.812 PRE-OPERATIVE LABORATORY EXAMINATION: ICD-10-CM

## 2021-03-05 DIAGNOSIS — Z01.812 PRE-OPERATIVE LABORATORY EXAMINATION: Primary | ICD-10-CM

## 2021-03-05 PROCEDURE — 93005 ELECTROCARDIOGRAM TRACING: CPT | Performed by: OTOLARYNGOLOGY

## 2021-03-06 LAB
QT INTERVAL: 400 MS
QTC INTERVAL: 456 MS

## 2021-07-02 ENCOUNTER — TRANSCRIBE ORDERS (OUTPATIENT)
Dept: ADMINISTRATIVE | Facility: HOSPITAL | Age: 76
End: 2021-07-02

## 2021-07-02 DIAGNOSIS — Z12.31 VISIT FOR SCREENING MAMMOGRAM: Primary | ICD-10-CM

## 2021-08-03 ENCOUNTER — HOSPITAL ENCOUNTER (OUTPATIENT)
Dept: MAMMOGRAPHY | Facility: HOSPITAL | Age: 76
Discharge: HOME | End: 2021-08-03
Admitting: FAMILY MEDICINE

## 2021-08-03 DIAGNOSIS — Z12.31 VISIT FOR SCREENING MAMMOGRAM: ICD-10-CM

## 2021-08-03 PROCEDURE — 77063 BREAST TOMOSYNTHESIS BI: CPT

## 2021-08-03 PROCEDURE — 77067 SCR MAMMO BI INCL CAD: CPT | Performed by: RADIOLOGY

## 2021-08-03 PROCEDURE — 77067 SCR MAMMO BI INCL CAD: CPT

## 2021-08-03 PROCEDURE — 77063 BREAST TOMOSYNTHESIS BI: CPT | Performed by: RADIOLOGY

## 2022-07-19 ENCOUNTER — TRANSCRIBE ORDERS (OUTPATIENT)
Dept: ADMINISTRATIVE | Facility: HOSPITAL | Age: 77
End: 2022-07-19

## 2022-07-19 DIAGNOSIS — Z12.31 VISIT FOR SCREENING MAMMOGRAM: Primary | ICD-10-CM

## 2022-08-09 ENCOUNTER — HOSPITAL ENCOUNTER (OUTPATIENT)
Dept: MAMMOGRAPHY | Facility: HOSPITAL | Age: 77
Discharge: HOME | End: 2022-08-09
Admitting: FAMILY MEDICINE

## 2022-08-09 DIAGNOSIS — Z12.31 VISIT FOR SCREENING MAMMOGRAM: ICD-10-CM

## 2022-08-09 PROCEDURE — 77063 BREAST TOMOSYNTHESIS BI: CPT

## 2022-08-09 PROCEDURE — 77067 SCR MAMMO BI INCL CAD: CPT | Performed by: RADIOLOGY

## 2022-08-09 PROCEDURE — 77063 BREAST TOMOSYNTHESIS BI: CPT | Performed by: RADIOLOGY

## 2022-08-09 PROCEDURE — 77067 SCR MAMMO BI INCL CAD: CPT

## 2023-08-10 ENCOUNTER — HOSPITAL ENCOUNTER (OUTPATIENT)
Dept: MAMMOGRAPHY | Facility: HOSPITAL | Age: 78
Discharge: HOME | End: 2023-08-10
Admitting: FAMILY MEDICINE
Payer: MEDICARE

## 2023-08-10 DIAGNOSIS — Z12.31 VISIT FOR SCREENING MAMMOGRAM: ICD-10-CM

## 2023-08-10 PROCEDURE — 77067 SCR MAMMO BI INCL CAD: CPT

## 2023-08-10 PROCEDURE — 77063 BREAST TOMOSYNTHESIS BI: CPT

## 2024-07-30 ENCOUNTER — TRANSCRIBE ORDERS (OUTPATIENT)
Dept: ADMINISTRATIVE | Facility: HOSPITAL | Age: 79
End: 2024-07-30
Payer: MEDICARE

## 2024-07-30 DIAGNOSIS — Z12.31 SCREENING MAMMOGRAM FOR BREAST CANCER: Primary | ICD-10-CM

## 2024-09-20 ENCOUNTER — HOSPITAL ENCOUNTER (OUTPATIENT)
Dept: MAMMOGRAPHY | Facility: HOSPITAL | Age: 79
Discharge: HOME | End: 2024-09-20
Admitting: FAMILY MEDICINE
Payer: MEDICARE

## 2024-09-20 DIAGNOSIS — Z12.31 SCREENING MAMMOGRAM FOR BREAST CANCER: ICD-10-CM

## 2024-09-20 PROCEDURE — 77063 BREAST TOMOSYNTHESIS BI: CPT

## 2024-09-20 PROCEDURE — 77067 SCR MAMMO BI INCL CAD: CPT
